# Patient Record
Sex: FEMALE | Race: BLACK OR AFRICAN AMERICAN | NOT HISPANIC OR LATINO | URBAN - METROPOLITAN AREA
[De-identification: names, ages, dates, MRNs, and addresses within clinical notes are randomized per-mention and may not be internally consistent; named-entity substitution may affect disease eponyms.]

---

## 2023-07-19 ENCOUNTER — EMERGENCY (EMERGENCY)
Facility: HOSPITAL | Age: 18
LOS: 1 days | Discharge: HOME | End: 2023-07-19
Attending: EMERGENCY MEDICINE | Admitting: EMERGENCY MEDICINE
Payer: COMMERCIAL

## 2023-07-19 VITALS
RESPIRATION RATE: 15 BRPM | HEART RATE: 94 BPM | TEMPERATURE: 98 F | DIASTOLIC BLOOD PRESSURE: 72 MMHG | SYSTOLIC BLOOD PRESSURE: 115 MMHG | OXYGEN SATURATION: 99 %

## 2023-07-19 PROCEDURE — 71045 X-RAY EXAM CHEST 1 VIEW: CPT | Mod: 26

## 2023-07-19 PROCEDURE — 99284 EMERGENCY DEPT VISIT MOD MDM: CPT

## 2023-07-19 PROCEDURE — 93010 ELECTROCARDIOGRAM REPORT: CPT

## 2023-07-19 RX ORDER — ALBUTEROL 90 UG/1
1 AEROSOL, METERED ORAL ONCE
Refills: 0 | Status: COMPLETED | OUTPATIENT
Start: 2023-07-19 | End: 2023-07-19

## 2023-07-19 RX ORDER — ACETAMINOPHEN 500 MG
650 TABLET ORAL ONCE
Refills: 0 | Status: COMPLETED | OUTPATIENT
Start: 2023-07-19 | End: 2023-07-19

## 2023-07-19 RX ORDER — IPRATROPIUM/ALBUTEROL SULFATE 18-103MCG
3 AEROSOL WITH ADAPTER (GRAM) INHALATION ONCE
Refills: 0 | Status: COMPLETED | OUTPATIENT
Start: 2023-07-19 | End: 2023-07-19

## 2023-07-19 RX ORDER — IPRATROPIUM/ALBUTEROL SULFATE 18-103MCG
3 AEROSOL WITH ADAPTER (GRAM) INHALATION
Refills: 0 | Status: COMPLETED | OUTPATIENT
Start: 2023-07-19 | End: 2023-07-19

## 2023-07-19 RX ADMIN — Medication 40 MILLIGRAM(S): at 02:16

## 2023-07-19 RX ADMIN — Medication 650 MILLIGRAM(S): at 03:17

## 2023-07-19 RX ADMIN — Medication 650 MILLIGRAM(S): at 02:17

## 2023-07-19 RX ADMIN — Medication 3 MILLILITER(S): at 02:45

## 2023-07-19 RX ADMIN — Medication 3 MILLILITER(S): at 02:26

## 2023-07-19 RX ADMIN — Medication 3 MILLILITER(S): at 02:25

## 2023-07-19 RX ADMIN — ALBUTEROL 1 PUFF(S): 90 AEROSOL, METERED ORAL at 03:02

## 2023-07-19 NOTE — ED ADULT NURSE NOTE - OBJECTIVE STATEMENT
19 y/o female presents to the ED for c/o left rib pain, cough, and wheezing X 1 day. Denies fever. Hx of asthma, Combivent given in the field with relief. A&OX4 and denies pain. Pt breathing room air in no acute distress, no wheezing present and pt speaking in full sentences.

## 2023-07-19 NOTE — ED PROVIDER NOTE - NSFOLLOWUPCLINICS_GEN_ALL_ED_FT
[facility] Pulmonolgy and Sleep Medicine  Pulmonology  [address] [place] [address], [place] [address]  Phone: [phone]  Fax:

## 2023-07-19 NOTE — ED PROVIDER NOTE - PATIENT PORTAL LINK FT
You can access the FollowMyHealth Patient Portal offered by [facility] by registering at the following website: [email or web address]. By joining [employer]’s FollowMyHealth portal, you will also be able to view your health information using other applications (apps) compatible with our system.

## 2023-07-19 NOTE — ED PROVIDER NOTE - CLINICAL SUMMARY MEDICAL DECISION MAKING FREE TEXT BOX
19 y/o F PMHx of asthma p/w chest tightness associated with SOB. Patient states symptoms started yesterday when she was walking home. States dry cough however no fevers, chills. States one of her albuterol inhalers is . States she used x3 pumps of the other albuterol inhaler for some relief. States she received one treatment via EMS. Denies prior hospitalizations or intubation for asthma. States she follows up with her pediatrician who manages her asthma. Denies any abd pain, nausea, vomiting, diarrhea, back pain.   Vital signs stable, afebrile, not hypoxic. Lung sounds clear to auscultation. Will provide symptomatic relief with duoneb and tyelenol, cxr, ekg  Differential diagnosis includes but not limited to asthma exacerbation vs. pneumothorax 17 y/o F PMHx of asthma p/w chest tightness associated with SOB. Patient states symptoms started yesterday when she was walking home. States dry cough however no fevers, chills. States one of her albuterol inhalers is . States she used x3 pumps of the other albuterol inhaler for some relief. States she received one treatment via EMS. Denies prior hospitalizations or intubation for asthma. States she follows up with her pediatrician who manages her asthma. Denies any abd pain, nausea, vomiting, diarrhea, back pain.   Vital signs stable, afebrile, not hypoxic. No wheezing appreciated on lung auscultation however with limited air movement . Will provide symptomatic relief with duonebs, prednisone, and Tylenol, cxr, ekg  Differential diagnosis includes but not limited to asthma exacerbation vs. pneumothorax

## 2023-07-19 NOTE — ED ADULT NURSE NOTE - NSFALLUNIVINTERV_ED_ALL_ED
Bed/Stretcher in lowest position, wheels locked, appropriate side rails in place/Call bell, personal items and telephone in reach/Instruct patient to call for assistance before getting out of bed/chair/stretcher/Non-slip footwear applied when patient is off stretcher/[place] to call system/Physically safe environment - no spills, clutter or unnecessary equipment/Purposeful proactive rounding/Room/bathroom lighting operational, light cord in reach

## 2023-07-19 NOTE — ED PROVIDER NOTE - PHYSICAL EXAMINATION
Poli Diane DO (PGY2)   Physical Exam:    Gen: NAD, AOx3  Head: NCAT  HEENT: EOMI, PEERLA, normal conjunctiva, tongue midline, oral mucosa moist  Lung: CTAB, no respiratory distress, no wheezes/rhonchi/rales B/L  CV: RRR, no murmurs, rubs or gallops  Abd: soft, NT, ND, no guarding, no rigidity, no rebound tenderness, no CVA tenderness   MSK: no visible deformities, ROM normal in UE/LE, no back pain  Neuro: No focal sensory or motor deficits  Skin: Warm, well perfused, no rash, no leg swelling Poli Diane DO (PGY2)   Physical Exam:    Gen: NAD, AOx3  Head: NCAT  HEENT: EOMI, PEERLA, normal conjunctiva, tongue midline, oral mucosa moist  Lung: CTAB however limited air movement, no respiratory distress, no wheezes/rhonchi/rales B/L  CV: RRR, no murmurs, rubs or gallops  Abd: soft, NT, ND, no guarding, no rigidity, no rebound tenderness, no CVA tenderness   MSK: no visible deformities, ROM normal in UE/LE, no back pain  Neuro: No focal sensory or motor deficits  Skin: Warm, well perfused, no rash, no leg swelling

## 2023-07-19 NOTE — ED PROVIDER NOTE - NSFOLLOWUPINSTRUCTIONS_ED_ALL_ED_FT
Asthma    Asthma is a condition in which the airways tighten and narrow, making it difficult to breath. Asthma episodes, also called asthma attacks, range from minor to life-threatening. Symptoms include wheezing, coughing, chest tightness, or shortness of breath. The diagnosis of asthma is made by a review of your medical history and a physical exam, but may involve additional testing. Asthma cannot be cured, but medicines and lifestyle changes can help control it. Avoid triggers of asthma which may include animal dander, pollen, mold, smoke, air pollutants, etc.     SEEK IMMEDIATE MEDICAL CARE IF YOU HAVE ANY OF THE FOLLOWING SYMPTOMS: worsening of symptoms, shortness of breath at rest, chest pain, bluish discoloration to lips or fingertips, lightheadedness/dizziness, or fever.    Please follow up with your primary medical doctor within two days.  Return to the emergency department if you feel that your condition is worsening    You can take Tylenol or Advil for pain. Please follow the instructions on the bottles. Asthma    Asthma is a condition in which the airways tighten and narrow, making it difficult to breath. Asthma episodes, also called asthma attacks, range from minor to life-threatening. Symptoms include wheezing, coughing, chest tightness, or shortness of breath. The diagnosis of asthma is made by a review of your medical history and a physical exam, but may involve additional testing. Asthma cannot be cured, but medicines and lifestyle changes can help control it. Avoid triggers of asthma which may include animal dander, pollen, mold, smoke, air pollutants, etc.     SEEK IMMEDIATE MEDICAL CARE IF YOU HAVE ANY OF THE FOLLOWING SYMPTOMS: worsening of symptoms, shortness of breath at rest, chest pain, bluish discoloration to lips or fingertips, lightheadedness/dizziness, or fever.    YOU WERE SENT MEDICATION TO YOUR PHARMACY - PLEASE TAKE AS DIRECTED    PLEASE FOLLOW UP WITH A PULMONOLOGIST     Please follow up with your primary medical doctor within two days.  Return to the emergency department if you feel that your condition is worsening    You can take Tylenol or Advil for pain. Please follow the instructions on the bottles.

## 2023-07-19 NOTE — ED PROVIDER NOTE - OBJECTIVE STATEMENT
19 y/o F PMHx of asthma p/w chest tightness associated with SOB. 17 y/o F PMHx of asthma p/w chest tightness associated with SOB. Patient states symptoms started yesterday when she was walking home. States dry cough however no fevers, chills. States one of her albuterol inhalers is . States she used x3 pumps of the other albuterol inhaler for some relief. States she received one treatment via EMS. Denies prior hospitalizations or intubation for asthma. States she follows up with her pediatrician who manages her asthma. Denies any abd pain, nausea, vomiting, diarrhea, back pain.

## 2023-07-19 NOTE — ED PROVIDER NOTE - ATTENDING CONTRIBUTION TO CARE
I performed a face-to-face evaluation of the patient and performed a history and physical examination along with the resident or ACP, and/or medical student above.  I agree with the history and physical examination as documented by the resident or ACP, and/or medical student above.  Shaw:  Gen: NAD, AOx3  Head: NCAT  HEENT: EOMI, PEERLA, normal conjunctiva, tongue midline, oral mucosa moist  Lung: CTAB however limited air movement, no respiratory distress, no wheezes/rhonchi/rales B/L  CV: RRR, no murmurs, rubs or gallops  Abd: soft, NT, ND, no guarding, no rigidity, no rebound tenderness, no CVA tenderness   MSK: no visible deformities, ROM normal in UE/LE, no back pain  Neuro: No focal sensory or motor deficits  Skin: Warm, well perfused, no rash, no leg swelling

## 2023-07-19 NOTE — ED ADULT TRIAGE NOTE - CHIEF COMPLAINT QUOTE
Pt c/o chest pain started tonight. Endorses intermittent sob x 1 week. Denies fever, chills, cough. Pt given Combivent in the field with relief. NAD, talking in full sentences. Past Medical History : Asthma.

## 2023-07-20 ENCOUNTER — APPOINTMENT (OUTPATIENT)
Dept: PULMONOLOGY | Facility: CLINIC | Age: 18
End: 2023-07-20
Payer: COMMERCIAL

## 2023-07-20 ENCOUNTER — LABORATORY RESULT (OUTPATIENT)
Age: 18
End: 2023-07-20

## 2023-07-20 VITALS
HEART RATE: 82 BPM | BODY MASS INDEX: 22.63 KG/M2 | DIASTOLIC BLOOD PRESSURE: 70 MMHG | HEIGHT: 62 IN | OXYGEN SATURATION: 99 % | SYSTOLIC BLOOD PRESSURE: 104 MMHG | WEIGHT: 123 LBS

## 2023-07-20 DIAGNOSIS — Z00.00 ENCOUNTER FOR GENERAL ADULT MEDICAL EXAMINATION W/OUT ABNORMAL FINDINGS: ICD-10-CM

## 2023-07-20 PROBLEM — J45.909 UNSPECIFIED ASTHMA, UNCOMPLICATED: Chronic | Status: ACTIVE | Noted: 2023-07-19

## 2023-07-20 PROCEDURE — ZZZZZ: CPT

## 2023-07-20 PROCEDURE — 94060 EVALUATION OF WHEEZING: CPT

## 2023-07-20 PROCEDURE — 94729 DIFFUSING CAPACITY: CPT

## 2023-07-20 PROCEDURE — 99204 OFFICE O/P NEW MOD 45 MIN: CPT | Mod: 25

## 2023-07-20 PROCEDURE — 94726 PLETHYSMOGRAPHY LUNG VOLUMES: CPT

## 2023-07-20 RX ORDER — NORETHINDRONE ACETATE AND ETHINYL ESTRADIOL AND FERROUS FUMARATE 1MG-20(21)
1-20 KIT ORAL
Refills: 0 | Status: ACTIVE | COMMUNITY

## 2023-07-20 RX ORDER — NEBULIZER ACCESSORIES
KIT MISCELLANEOUS
Qty: 1 | Refills: 1 | Status: ACTIVE | COMMUNITY
Start: 2023-07-20 | End: 1900-01-01

## 2023-07-20 RX ORDER — ALBUTEROL SULFATE 2.5 MG/3ML
(2.5 MG/3ML) SOLUTION RESPIRATORY (INHALATION)
Qty: 1 | Refills: 6 | Status: ACTIVE | COMMUNITY
Start: 2023-07-20 | End: 1900-01-01

## 2023-07-20 RX ORDER — MONTELUKAST 10 MG/1
10 TABLET, FILM COATED ORAL
Qty: 30 | Refills: 6 | Status: ACTIVE | COMMUNITY
Start: 2023-07-20 | End: 1900-01-01

## 2023-07-20 RX ORDER — ALBUTEROL SULFATE 90 UG/1
108 (90 BASE) INHALANT RESPIRATORY (INHALATION) EVERY 6 HOURS
Qty: 1 | Refills: 2 | Status: ACTIVE | COMMUNITY
Start: 2023-07-20 | End: 1900-01-01

## 2023-07-20 RX ORDER — FLUTICASONE FUROATE AND VILANTEROL TRIFENATATE 100; 25 UG/1; UG/1
100-25 POWDER RESPIRATORY (INHALATION)
Qty: 1 | Refills: 6 | Status: ACTIVE | COMMUNITY
Start: 2023-07-20 | End: 1900-01-01

## 2023-07-20 NOTE — PHYSICAL EXAM
[No Acute Distress] : no acute distress [Well Nourished] : well nourished [Well Groomed] : well groomed [No Deformities] : no deformities [Well Developed] : well developed [Normal Oropharynx] : normal oropharynx [Normal Appearance] : normal appearance [Supple] : supple [No Neck Mass] : no neck mass [Normal Rate/Rhythm] : normal rate/rhythm [Normal S1, S2] : normal s1, s2 [No Resp Distress] : no resp distress [No Acc Muscle Use] : no acc muscle use [Clear to Auscultation Bilaterally] : clear to auscultation bilaterally [Benign] : benign [Soft] : soft [Normal Gait] : normal gait [No Clubbing] : no clubbing [No Cyanosis] : no cyanosis [No Edema] : no edema [Normal Color/ Pigmentation] : normal color/ pigmentation [No Focal Deficits] : no focal deficits [Oriented x3] : oriented x3 [Normal Affect] : normal affect [TextBox_11] : NCAT, EOMI, anicteric, MMM, nares clear, no thrush [TextBox_68] : no wheeze or rhonchi

## 2023-07-20 NOTE — HISTORY OF PRESENT ILLNESS
[Never] : never [Nasal Passage Blockage (Stuffiness)] : edema [Fever] : fever [Difficulty Breathing During Exertion] : dyspnea on exertion [Cough] : coughing [Wheezing] : wheezing [Awakes Unrefreshed] : awakes unrefreshed [Difficulty Initiating Sleep] : difficulty initiating sleep [TextBox_4] : 18F asthma, allergies, postnasal drip, and panic attacks related to her difficulty breathing presenting for initial pulmonary evaluation of worsening asthma symptoms. She was in the ED for asthma exacerbation yesterday and treated with bronchodilators and Prednisone. She was discharged from the ED.\par \par She has a history of asthma since childhood. She has been prescribed Flovent TID and Albuterol PRN. She uses the Flovent intermittently but finds it difficult to use at night. She has been using Albuterol with mild relief but notes she does better with nebulizer. She was in college ([place]) and was requiring nebulizer treatments from the [facility] there on a regular basis. She does not have a home nebulizer.\par \par She does not see a pulmonologist or asthma specialist regularly. Her father has asthma as well but is reportedly controlled. 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 She often has chest pain and wheezing but has not noted any triggers. She is not planning to return to [place] at this time given her asthma symptoms she tells me.\par \par She has an extensive history of seasonal allergies and allergies to multiple fruits. She is a never smoker. She does not take any standing allergy medications. \par \par She denies recent travels or sick contacts. She denies any other standing medical problems. She tells me she remains very active but her wheezing does limit her activity.\par \par PFTs: 7/20/23 - FEV1 2.69L (99%), FVC 3.13L (102%), FEV1/FVC 86%, FEF 25-75 85%, %, RV/TLC 23%, DLCO 68% - no BD resp [TextBox_57] : CXR 7/19/23 - FINDINGS: The heart is normal in size. The lungs are clear. There is no pneumothorax or pleural effusion.\par No acute bony abnormality. \par IMPRESSION: Clear lungs.

## 2023-07-20 NOTE — ASSESSMENT
[FreeTextEntry1] : 18F asthma, allergies, postnasal drip, and panic attacks related to her difficulty breathing presenting for initial pulmonary evaluation of worsening asthma symptoms. She was in the ED for asthma exacerbation yesterday and treated with bronchodilators and Prednisone. She was discharged from the ED.\par \par #Asthma\par - Uncontrolled with recent ED visit yesterday\par - CXR clear\par - Complete course of Prednisone as prescribed by ED - no wheezing on exam today\par - Will start Breo daily - stop Flovent and reviewed use\par - Albuterol PRN\par - recommend starting Singulair daily - given allergy history\par - Will place order for nebulizer machine and Albuterol nebs\par - Check lab work: CBC with diff, IgE, Aspergillus, and Asthma profile - disucssed possibility of biologic therapy in future if no improvement with bronchodilators and if targetable abnormalities. Eos may not be high as patient received steroids in ED yesterday\par - No nasal polyps\par \par #Rhinitis and Allergies\par - Trial of Astelin\par - Singulair as above\par - May need Allergy evaluation for further evaluation and consideration of allergy shots if significant allergens noted\par \par #Trouble Sleeping\par - No clear signs and symptoms of sleep disorder breathing but patient states she wakes up fatigued and has difficulty sleeping\par - Will refer for diagnostic HST to ensure sleep apnea and nocturnal hypoxemia not contributing to these symptoms\par - Sleep hygiene will be important\par \par #Health Maintenance\par Spirometry: Reviewed\par Smoking status: Never smoker \par Pneumococcal vaccination status: Deferred today - will need PMD records. Would be a candidate based on asthma\par [place] Sleepiness Scale: Not a sleep visit \par \par #Follow-up in 1 month to evaluate response to treatment\par - All questions answered\par \par \par The above plan was discussed with PARAMJIT DIGGS  in detail. Patient verbalized understanding and agrees with plan as detailed above. Patient was provided education and counselling on current diagnosis/symptoms, diagnostic work up, treatment options and potential side effects of any prescribed therapy/therapies. PARAMJIT  was advised to call our clinic at [phone] for any new or worsening symptoms, or with any questions or concerns. In case of acute onset of respiratory symptoms or worsening presentation, patient was advised to present to nearest emergency room for further evaluation. PARAMJIT  expressed understanding and all questions/concerns were addressed.\par

## 2023-07-20 NOTE — REVIEW OF SYSTEMS
[Postnasal Drip] : postnasal drip [Cough] : cough [Chest Tightness] : chest tightness [Wheezing] : wheezing [SOB on Exertion] : sob on exertion [Hay Fever] : hay fever [Seasonal Allergies] : seasonal allergies [Panic Attacks] : panic attacks [Fever] : no fever [Fatigue] : no fatigue [Recent Wt Gain (___ Lbs)] : ~T no recent weight gain [Chills] : no chills [Poor Appetite] : no poor appetite [Sore Throat] : no sore throat [Nasal Congestion] : no nasal congestion [Sinus Problems] : no sinus problems [Hemoptysis] : no hemoptysis [Frequent URIs] : no frequent URIs [Sputum] : no sputum [Dyspnea] : no dyspnea [Chest Discomfort] : no chest discomfort [Edema] : no edema [Watery Eyes] : no watery eyes [GERD] : no gerd [Abdominal Pain] : no abdominal pain [Nausea] : no nausea [Vomiting] : no vomiting [Dysuria] : no dysuria [Arthralgias] : no arthralgias [Myalgias] : no myalgias [Rash] : no rash [Headache] : no headache [Focal Weakness] : no focal weakness [Seizures] : no seizures [Head Injury] : no head injury [Diabetes] : no diabetes [Thyroid Problem] : no thyroid problem [Obesity] : no obesity

## 2023-07-20 NOTE — CONSULT LETTER
[Dear  ___] : Dear  [unfilled], [Consult Letter:] : I had the pleasure of evaluating your patient, [unfilled]. [Please see my note below.] : Please see my note below. [Consult Closing:] : Thank you very much for allowing me to participate in the care of this patient.  If you have any questions, please do not hesitate to contact me. [Sincerely,] : Sincerely, [FreeTextEntry3] : Rom Howard MD, FACP, FCCP\par Division of Pulmonary, Critical Care, and Sleep Medicine\par Associate Professor of Medicine \par [employer] [facility]\par

## 2023-07-24 LAB
A ALTERNATA IGE QN: <0.1 KUA/L
A FUMIGATUS IGE QN: <0.1 KUA/L
BASOPHILS # BLD AUTO: 0.02 K/UL
BASOPHILS NFR BLD AUTO: 0.2 %
C ALBICANS IGE QN: <0.1 KUA/L
C HERBARUM IGE QN: <0.1 KUA/L
CAT DANDER IGE QN: <0.1 KUA/L
COMMON RAGWEED IGE QN: 0.3 KUA/L
D FARINAE IGE QN: 4.06 KUA/L
D PTERONYSS IGE QN: 2.67 KUA/L
DEPRECATED A ALTERNATA IGE RAST QL: 0
DEPRECATED A FUMIGATUS IGE RAST QL: 0
DEPRECATED C ALBICANS IGE RAST QL: 0
DEPRECATED C HERBARUM IGE RAST QL: 0
DEPRECATED CAT DANDER IGE RAST QL: 0
DEPRECATED COMMON RAGWEED IGE RAST QL: NORMAL
DEPRECATED D FARINAE IGE RAST QL: 3
DEPRECATED D PTERONYSS IGE RAST QL: 2
DEPRECATED DOG DANDER IGE RAST QL: 0
DEPRECATED M RACEMOSUS IGE RAST QL: 0
DEPRECATED ROACH IGE RAST QL: 0
DEPRECATED TIMOTHY IGE RAST QL: NORMAL
DEPRECATED WHITE OAK IGE RAST QL: 3
DOG DANDER IGE QN: <0.1 KUA/L
EOSINOPHIL # BLD AUTO: 0 K/UL
EOSINOPHIL NFR BLD AUTO: 0 %
HCT VFR BLD CALC: 39.5 %
HGB BLD-MCNC: 12.2 G/DL
IMM GRANULOCYTES NFR BLD AUTO: 0.3 %
LYMPHOCYTES # BLD AUTO: 2.83 K/UL
LYMPHOCYTES NFR BLD AUTO: 25.4 %
M RACEMOSUS IGE QN: <0.1 KUA/L
MAN DIFF?: NORMAL
MCHC RBC-ENTMCNC: 27.6 PG
MCHC RBC-ENTMCNC: 30.9 GM/DL
MCV RBC AUTO: 89.4 FL
MONOCYTES # BLD AUTO: 0.49 K/UL
MONOCYTES NFR BLD AUTO: 4.4 %
NEUTROPHILS # BLD AUTO: 7.77 K/UL
NEUTROPHILS NFR BLD AUTO: 69.7 %
PLATELET # BLD AUTO: 259 K/UL
RBC # BLD: 4.42 M/UL
RBC # FLD: 13.2 %
ROACH IGE QN: <0.1 KUA/L
TIMOTHY IGE QN: 0.17 KUA/L
TOTAL IGE SMQN RAST: 52 KU/L
WBC # FLD AUTO: 11.14 K/UL
WHITE OAK IGE QN: 5.7 KUA/L

## 2023-07-25 LAB
A FLAVUS AB FLD QL: NEGATIVE
A FUMIGATUS AB FLD QL: NEGATIVE
A NIGER AB FLD QL: NEGATIVE

## 2023-08-08 ENCOUNTER — OUTPATIENT (OUTPATIENT)
Dept: OUTPATIENT SERVICES | Facility: HOSPITAL | Age: 18
LOS: 1 days | End: 2023-08-08
Payer: COMMERCIAL

## 2023-08-08 ENCOUNTER — APPOINTMENT (OUTPATIENT)
Dept: SLEEP CENTER | Facility: CLINIC | Age: 18
End: 2023-08-08
Payer: COMMERCIAL

## 2023-08-08 PROCEDURE — 95800 SLP STDY UNATTENDED: CPT

## 2023-08-08 PROCEDURE — 95800 SLP STDY UNATTENDED: CPT | Mod: 26

## 2023-08-09 ENCOUNTER — APPOINTMENT (OUTPATIENT)
Dept: PULMONOLOGY | Facility: CLINIC | Age: 18
End: 2023-08-09
Payer: COMMERCIAL

## 2023-08-09 VITALS
RESPIRATION RATE: 16 BRPM | HEIGHT: 62 IN | TEMPERATURE: 97.3 F | HEART RATE: 79 BPM | SYSTOLIC BLOOD PRESSURE: 94 MMHG | WEIGHT: 121.6 LBS | DIASTOLIC BLOOD PRESSURE: 63 MMHG | BODY MASS INDEX: 22.38 KG/M2 | OXYGEN SATURATION: 99 %

## 2023-08-09 DIAGNOSIS — J45.909 UNSPECIFIED ASTHMA, UNCOMPLICATED: ICD-10-CM

## 2023-08-09 DIAGNOSIS — G47.9 SLEEP DISORDER, UNSPECIFIED: ICD-10-CM

## 2023-08-09 DIAGNOSIS — J30.89 OTHER ALLERGIC RHINITIS: ICD-10-CM

## 2023-08-09 PROCEDURE — 99214 OFFICE O/P EST MOD 30 MIN: CPT

## 2023-08-09 RX ORDER — AZELASTINE HYDROCHLORIDE 137 UG/1
137 SPRAY, METERED NASAL TWICE DAILY
Qty: 1 | Refills: 3 | Status: DISCONTINUED | COMMUNITY
Start: 2023-07-20 | End: 2023-08-09

## 2023-08-09 NOTE — PHYSICAL EXAM
[No Acute Distress] : no acute distress [Well Nourished] : well nourished [Well Groomed] : well groomed [No Deformities] : no deformities [Well Developed] : well developed [Normal Oropharynx] : normal oropharynx [Normal Appearance] : normal appearance [Supple] : supple [No Neck Mass] : no neck mass [Normal Rate/Rhythm] : normal rate/rhythm [Normal S1, S2] : normal s1, s2 [No Resp Distress] : no resp distress [No Acc Muscle Use] : no acc muscle use [Clear to Auscultation Bilaterally] : clear to auscultation bilaterally [Benign] : benign [Soft] : soft [Normal Gait] : normal gait [No Clubbing] : no clubbing [No Cyanosis] : no cyanosis [No Edema] : no edema [Normal Color/ Pigmentation] : normal color/ pigmentation [No Focal Deficits] : no focal deficits [Oriented x3] : oriented x3 [Normal Affect] : normal affect [TextBox_11] : NCAT, EOMI, anicteric, MMM, nares clear, no thrush [TextBox_68] : no wheeze or rhonchi, good air entry

## 2023-08-09 NOTE — ASSESSMENT
[FreeTextEntry1] : 18F asthma, allergies, postnasal drip, and panic attacks related to her difficulty breathing presenting for follow-up pulmonary evaluation of asthma symptoms.   She has not required any ED or Urgent Care visits since her appointment last month and has not required further oral corticosteroids. She is doing better today and without any acute complaints.  #Asthma - Continue Breo daily - Recommended more consistent use of Singulair given allergy history - CXR clear from hospitalization 7/19/23 - Albuterol PRN - Initial labs without IgE or eosinophlia - but this was done while patient was on steroids. If symptoms worsen will repeat. Does not require oral corticosteroids or biologic therapy at this time but will continue to monitor - No nasal polyps on exam  #Rhinitis and Allergies - Migraines noted with Astelin - monitor off. Can trial Flonase if UACS/PND symptoms return - Singulair as above - May need Allergy evaluation for further evaluation and consideration of allergy shots if symptoms worsen/recur. Asthma profile notes response to [place] tree and various Dust Mites  #Trouble Sleeping - No clear signs and symptoms of sleep disorder breathing but patient states she wakes up fatigued and has difficulty sleeping - Patient had HST done overnight - aware that results will take about 1 week. Once I have received the results I will call her and discuss further plan of care based on results. - Sleep hygiene will be important  #Health Maintenance Spirometry: Reviewed Smoking status: Never smoker  Pneumococcal vaccination status: Deferred. Patient is a candidate based on asthma [place] Sleepiness Scale: Not a sleep visit   #Follow-up in 2-4 months or sooner as needed - All questions answered   The above plan was discussed with PARAMJIT DIGGS  in detail. Patient verbalized understanding and agrees with plan as detailed above. Patient was provided education and counselling on current diagnosis/symptoms, diagnostic work up, treatment options and potential side effects of any prescribed therapy/therapies. PARAMJIT  was advised to call our clinic at [phone] for any new or worsening symptoms, or with any questions or concerns. In case of acute onset of respiratory symptoms or worsening presentation, patient was advised to present to nearest emergency room for further evaluation. PARAMJIT  expressed understanding and all questions/concerns were addressed.

## 2023-08-09 NOTE — REVIEW OF SYSTEMS
[Postnasal Drip] : postnasal drip [Hay Fever] : hay fever [Seasonal Allergies] : seasonal allergies [Panic Attacks] : panic attacks [Fever] : no fever [Fatigue] : no fatigue [Recent Wt Gain (___ Lbs)] : ~T no recent weight gain [Chills] : no chills [Poor Appetite] : no poor appetite [Sore Throat] : no sore throat [Nasal Congestion] : no nasal congestion [Sinus Problems] : no sinus problems [Cough] : no cough [Hemoptysis] : no hemoptysis [Chest Tightness] : no chest tightness [Frequent URIs] : no frequent URIs [Sputum] : no sputum [Dyspnea] : no dyspnea [Wheezing] : no wheezing [SOB on Exertion] : no sob on exertion [Chest Discomfort] : no chest discomfort [Edema] : no edema [Watery Eyes] : no watery eyes [GERD] : no gerd [Abdominal Pain] : no abdominal pain [Nausea] : no nausea [Vomiting] : no vomiting [Dysuria] : no dysuria [Arthralgias] : no arthralgias [Myalgias] : no myalgias [Rash] : no rash [Headache] : no headache [Focal Weakness] : no focal weakness [Seizures] : no seizures [Head Injury] : no head injury [Diabetes] : no diabetes [Thyroid Problem] : no thyroid problem [Obesity] : no obesity

## 2023-08-09 NOTE — HISTORY OF PRESENT ILLNESS
[Never] : never [Nasal Passage Blockage (Stuffiness)] : edema [Fever] : fever [Cough] : coughing [Wheezing] : wheezing [Difficulty Breathing During Exertion] : dyspnea on exertion [0  -  Nothing at all] : 0, nothing at all [Awakes Unrefreshed] : awakes unrefreshed [Difficulty Initiating Sleep] : difficulty initiating sleep [TextBox_4] : 18F asthma, allergies, postnasal drip, and panic attacks related to her difficulty breathing presenting for follow-up pulmonary evaluation of worsening asthma symptoms. She was initially seen after an ED visit for asthma exacerbation on 7/19/23 treated with bronchodilators and Prednisone. She was discharged from the ED.  - Using Breo since last visit - with good response - Required Albuterol once at work - improved symptoms on that day. Otherwise has not required - No longer on Prednisone - Was unable to tolerate Astelin as it caused Migraines - so she has stopped this - She uses Singulair intermittently - on days where she has difficulty sleeping  - She had her HST done last night - aware that results will take ~ 1 week to come back - No complaints today - no cough, wheezing, or sputum production. No dyspnea or chest pain reported.  PFTs: 7/20/23 - FEV1 2.69L (99%), FVC 3.13L (102%), FEV1/FVC 86%, FEF 25-75 85%, %, RV/TLC 23%, DLCO 68% - no BD resp  From initial visit: She has a history of asthma since childhood. She has been prescribed Flovent TID and Albuterol PRN. She uses the Flovent intermittently but finds it difficult to use at night. She has been using Albuterol with mild relief but notes she does better with nebulizer. She was in [facility] ([place]) and was requiring nebulizer treatments from the [facility] there on a regular basis. She does not have a home nebulizer.  She does not see a pulmonologist or asthma specialist regularly. Her father has asthma as well but is reportedly controlled. 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 She often has chest pain and wheezing but has not noted any triggers. She is not planning to return to [place] [employer] at this time given her asthma symptoms she tells me.  She has an extensive history of seasonal allergies and allergies to multiple fruits. She is a never smoker. She does not take any standing allergy medications.   She denies recent travels or sick contacts. She denies any other standing medical problems. She tells me she remains very active but her wheezing does limit her activity.  [TextBox_100] : 8/9/23 - results pending [TextBox_57] : CXR 7/19/23 - FINDINGS: The heart is normal in size. The lungs are clear. There is no pneumothorax or pleural effusion.\par  No acute bony abnormality. \par  IMPRESSION: Clear lungs.

## 2023-08-18 DIAGNOSIS — G47.33 OBSTRUCTIVE SLEEP APNEA (ADULT) (PEDIATRIC): ICD-10-CM

## 2023-08-23 ENCOUNTER — TRANSCRIPTION ENCOUNTER (OUTPATIENT)
Age: 18
End: 2023-08-23